# Patient Record
Sex: MALE | Race: WHITE | NOT HISPANIC OR LATINO | URBAN - NONMETROPOLITAN AREA
[De-identification: names, ages, dates, MRNs, and addresses within clinical notes are randomized per-mention and may not be internally consistent; named-entity substitution may affect disease eponyms.]

---

## 2019-06-05 ENCOUNTER — IMPORTED ENCOUNTER (OUTPATIENT)
Dept: URBAN - NONMETROPOLITAN AREA CLINIC 1 | Facility: CLINIC | Age: 69
End: 2019-06-05

## 2019-06-05 PROBLEM — H25.13: Noted: 2019-06-05

## 2019-06-05 PROCEDURE — 92004 COMPRE OPH EXAM NEW PT 1/>: CPT

## 2019-06-05 PROCEDURE — 92015 DETERMINE REFRACTIVE STATE: CPT

## 2019-06-05 NOTE — PATIENT DISCUSSION
Cataracts OU-  discussed findings w/patient-  patient noticing progressively worsening vision OS>OD-  refer to [address] [place] [address] for cat eval patient agrees with plan -  monitor as per JSSimple Myopia OD/Compound Myopic Astigmatism OS w/Presbyopia -  discussed findings w/patient-  new spectacle Rx issued-  do not recommend update until after CE OU -  monitor yearly or prn; 's Notes: MR 6/5/2019DFE 6/5/2019

## 2019-08-09 ENCOUNTER — IMPORTED ENCOUNTER (OUTPATIENT)
Dept: URBAN - NONMETROPOLITAN AREA CLINIC 1 | Facility: CLINIC | Age: 69
End: 2019-08-09

## 2019-08-09 PROCEDURE — 92014 COMPRE OPH EXAM EST PT 1/>: CPT

## 2019-08-09 NOTE — PATIENT DISCUSSION
Cataract(s)-Visually significant cataract OU.-Cataract(s) causing symptomatic impairment of visual function not correctable with a tolerable change in glasses or contact lenses lighting or non-operative means resulting in specific activity limitations and/or participation restrictions including but not limited to reading viewing television driving or meeting vocational or recreational needs. -Expectation is clearer vision and functional improvement in symptoms as well as reduced glare disability after cataract removal.-Order IOLMaster and OPD today. -Recommend Toric IOL OU based on today's OPD testing and lifestyle questionnaire.-Discussed LenSx and RBAs of laser cataract surgery.-All questions were answered regarding surgery including pre and post-op medications appointments activity restrictions and anesthetic usage.-The risks benefits and alternatives and special risk factors for the patient were discussed in detail including but not limited to: bleeding infection retinal detachment vitreous loss problems with the implant and possible need for additional surgery.-Although rare the possibility of complete vision loss was discussed.-The possible need for glasses post-operatively was discussed.-Order medical clearance exam based on history of HBP depression and anxiety.-Patient elects to proceed with cataract surgery OD. Will schedule at patient's convenience and re-evaluate OS in the future. Myopia c myopic degeneration OU.-Discussed diagnosis with patient. -Explained that people who are myopic are at a higher risk for developing RD/RT and reviewed associated S&S.-Pt to contact our office if symptoms develop.; 's Notes: MR 6/5/2019DFE 6/5/2019

## 2019-09-10 PROBLEM — H52.222: Noted: 2019-09-10

## 2019-09-10 PROBLEM — H25.13: Noted: 2019-06-05

## 2019-09-10 PROBLEM — H52.13: Noted: 2019-09-10

## 2019-09-10 PROBLEM — H52.4: Noted: 2019-09-10

## 2019-09-13 PROBLEM — H25.13: Noted: 2019-09-13

## 2019-09-24 ENCOUNTER — IMPORTED ENCOUNTER (OUTPATIENT)
Dept: URBAN - NONMETROPOLITAN AREA CLINIC 1 | Facility: CLINIC | Age: 69
End: 2019-09-24

## 2019-09-24 PROBLEM — H25.13: Noted: 2019-09-24

## 2019-09-24 PROBLEM — I10: Noted: 2019-09-24

## 2019-09-24 PROBLEM — Z01.818: Noted: 2019-09-24

## 2019-09-24 PROBLEM — F33.9: Noted: 2019-09-24

## 2019-09-24 PROCEDURE — 99214 OFFICE O/P EST MOD 30 MIN: CPT

## 2019-09-24 NOTE — PATIENT DISCUSSION
Medical Clearance-Medical clearance done today. -No outstanding concerns that would preclude surgery.-Patient is cleared to proceed with scheduled surgery.; 's Notes: MR 6/5/2019<br />DFE 6/5/2019<br />

## 2019-10-14 ENCOUNTER — IMPORTED ENCOUNTER (OUTPATIENT)
Dept: URBAN - NONMETROPOLITAN AREA CLINIC 1 | Facility: CLINIC | Age: 69
End: 2019-10-14

## 2019-10-14 PROCEDURE — 99024 POSTOP FOLLOW-UP VISIT: CPT

## 2019-10-14 PROCEDURE — 92136 OPHTHALMIC BIOMETRY: CPT

## 2019-10-14 PROCEDURE — 66984 XCAPSL CTRC RMVL W/O ECP: CPT

## 2019-10-14 PROCEDURE — V2787 ASTIGMATISM-CORRECT FUNCTION: HCPCS

## 2019-10-14 NOTE — PATIENT DISCUSSION
s/p PCIOL-Pt doing well s/p PCIOL. -Continue post-op gtts according to instruction sheet and sleep with eye shield over eye for 7 nights.-Avoid bending at the waist lifting anything over 5lbs and dirty or julio environments. -RTC GAYATHRI.; 's Notes: MR 6/5/2019DFE 6/5/2019

## 2019-10-18 ENCOUNTER — IMPORTED ENCOUNTER (OUTPATIENT)
Dept: URBAN - NONMETROPOLITAN AREA CLINIC 1 | Facility: CLINIC | Age: 69
End: 2019-10-18

## 2019-10-18 PROBLEM — H25.13: Noted: 2019-10-18

## 2019-10-18 PROBLEM — I10: Noted: 2019-10-18

## 2019-10-18 PROBLEM — Z01.818: Noted: 2019-10-18

## 2019-10-18 PROBLEM — Z98.41: Noted: 2019-10-18

## 2019-10-18 PROBLEM — H25.12: Noted: 2019-10-18

## 2019-10-18 PROCEDURE — 99024 POSTOP FOLLOW-UP VISIT: CPT

## 2019-10-18 PROCEDURE — 99213 OFFICE O/P EST LOW 20 MIN: CPT

## 2019-10-18 NOTE — PATIENT DISCUSSION
Cataract(s)-Visually significant cataract OS. -Cataract(s) causing symptomatic impairment of visual function not correctable with a tolerable change in glasses or contact lenses lighting or non-operative means resulting in specific activity limitations and/or participation restrictions including but not limited to reading viewing television driving or meeting vocational or recreational needs. -Expectation is clearer vision and functional improvement in symptoms as well as reduced glare disability after cataract removal.-Recommend Toric IOL/Trad based on previous OPD testing and lifestyle questionnaire.-All questions were answered regarding surgery including pre and post-op medications appointments activity restrictions and anesthetic usage.-The risks benefits and alternatives and special risk factors for the patient were discussed in detail including but not limited to: bleeding infection retinal detachment vitreous loss problems with the implant and possible need for additional surgery.-Although rare the possibility of complete vision loss was discussed.-The need for glasses post-operatively was discussed.-Patient elects to proceed with cataract surgery OS. s/p PCIOL OD 10/14/19-Pt doing well at 1 week s/p PCIOL. -Continue post-op gtts according to instruction sheet.-Okay to resume usual activites and d/c eye shield.; 's Notes: MR 6/5/2019DFE 6/5/2019

## 2019-10-18 NOTE — PATIENT DISCUSSION
Medical Clearance-Medical clearance done today. -No outstanding concerns that would preclude surgery.-Patient is cleared to proceed with scheduled surgery.; 's Notes: MR 6/5/2019DFE 6/5/2019

## 2019-10-23 ENCOUNTER — IMPORTED ENCOUNTER (OUTPATIENT)
Dept: URBAN - NONMETROPOLITAN AREA CLINIC 1 | Facility: CLINIC | Age: 69
End: 2019-10-23

## 2019-10-23 PROBLEM — Z98.42: Noted: 2019-10-23

## 2019-10-23 PROCEDURE — V2787 ASTIGMATISM-CORRECT FUNCTION: HCPCS

## 2019-10-23 PROCEDURE — 92136 OPHTHALMIC BIOMETRY: CPT

## 2019-10-23 PROCEDURE — 66984 XCAPSL CTRC RMVL W/O ECP: CPT

## 2019-10-23 NOTE — PATIENT DISCUSSION
Same Day s/p PC IOL OS Trad/Toric 10/23/2019-  discussed findings w/patient-  Pt doing well s/p PCIOL. -  Continue post-op gtts according to instruction sheet and sleep with eye shield over eye for 7 nights. -  Avoid bending at the waist lifting anything over 5lbs and dirty or julio environments.-  RTC 1 week as scheduled or prn; 's Notes: MR 6/5/2019DFE 6/5/2019

## 2019-10-30 ENCOUNTER — IMPORTED ENCOUNTER (OUTPATIENT)
Dept: URBAN - NONMETROPOLITAN AREA CLINIC 1 | Facility: CLINIC | Age: 69
End: 2019-10-30

## 2019-10-30 PROCEDURE — 99024 POSTOP FOLLOW-UP VISIT: CPT

## 2019-10-30 NOTE — PATIENT DISCUSSION
1 week s/p PC IOL OS Trad/Toric 10/23/2019-  discussed findings w/patient-  Pt doing well at 1 week s/p PCIOL. -  Continue post-op gtts according to instruction sheet.-  Okay to resume usual activites and d/c eye shield. -  3 mo f/u Pseudophakia w/DFE; 's Notes: MR 6/5/2019DFE 6/5/2019

## 2022-04-10 ASSESSMENT — VISUAL ACUITY
OS_SC: 20/50
OS_AM: 20/25
OD_GLARE: 20/400
OD_PH: 20/30
OS_CC: J10
OU_SC: 20/40
OD_PAM: 20/25
OS_PH: 20/40
OD_CC: 20/30
OS_CC: CF4'
OD_SC: 20/40-2
OS_CC: CF4'
OD_GLARE: 20/50
OU_CC: J3
OD_SC: 20/40-2
OS_AM: 20/25
OS_GLARE: 20/400
OS_GLARE: 20/400
OS_PH: 20/100
OS_CC: 20/20-
OD_CC: 20/20
OU_CC: J1
OS_SC: 20/50
OS_GLARE: 20/50
OS_CC: 20/100
OU_CC: J1
OD_CC: 20/20
OD_CC: 20/30-1
OS_CC: CF4'

## 2022-04-10 ASSESSMENT — TONOMETRY
OD_IOP_MMHG: 18
OD_IOP_MMHG: 12
OD_IOP_MMHG: 13
OS_IOP_MMHG: 18
OS_IOP_MMHG: 14
OD_IOP_MMHG: 15
OS_IOP_MMHG: 14
OS_IOP_MMHG: 13
OD_IOP_MMHG: 14

## 2022-05-28 PROBLEM — E83.42 HYPOMAGNESEMIA: Status: ACTIVE | Noted: 2022-05-28

## 2022-05-28 PROBLEM — E78.5 HYPERLIPIDEMIA: Status: ACTIVE | Noted: 2019-09-16

## 2022-05-28 PROBLEM — N13.9 OBSTRUCTIVE UROPATHY: Status: ACTIVE | Noted: 2022-05-28

## 2022-05-28 PROBLEM — E87.1 HYPONATREMIA: Status: ACTIVE | Noted: 2022-05-28

## 2022-05-28 PROBLEM — F10.20 ALCOHOL DEPENDENCE (HCC): Status: ACTIVE | Noted: 2022-05-28

## 2022-05-28 PROBLEM — F13.20 BENZODIAZEPINE DEPENDENCE (HCC): Status: ACTIVE | Noted: 2022-05-28

## 2022-05-29 PROBLEM — A41.9 SEPTIC SHOCK (HCC): Status: ACTIVE | Noted: 2022-05-29

## 2022-05-29 PROBLEM — R65.21 SEPTIC SHOCK (HCC): Status: ACTIVE | Noted: 2022-05-29

## 2023-02-01 RX ORDER — DILTIAZEM HYDROCHLORIDE 180 MG/1
180 CAPSULE, EXTENDED RELEASE ORAL DAILY
COMMUNITY
Start: 2022-06-07

## 2023-02-01 RX ORDER — TRAZODONE HYDROCHLORIDE 100 MG/1
100 TABLET ORAL
COMMUNITY

## 2023-02-01 RX ORDER — MAGNESIUM OXIDE 400 MG/1
400 TABLET ORAL 3 TIMES DAILY
COMMUNITY
Start: 2022-06-07

## 2023-02-01 RX ORDER — BUSPIRONE HYDROCHLORIDE 15 MG/1
15 TABLET ORAL 2 TIMES DAILY
COMMUNITY

## 2023-02-01 RX ORDER — BUPROPION HYDROCHLORIDE 150 MG/1
150 TABLET ORAL DAILY
COMMUNITY

## 2023-02-01 RX ORDER — LANOLIN ALCOHOL/MO/W.PET/CERES
100 CREAM (GRAM) TOPICAL DAILY
COMMUNITY
Start: 2022-06-07

## 2023-02-01 RX ORDER — TAMSULOSIN HYDROCHLORIDE 0.4 MG/1
0.4 CAPSULE ORAL DAILY
COMMUNITY
Start: 2022-06-07

## 2023-02-01 RX ORDER — ALPRAZOLAM 0.5 MG/1
0.5 TABLET ORAL 4 TIMES DAILY PRN
COMMUNITY

## 2023-02-01 RX ORDER — DUTASTERIDE 0.5 MG/1
0.5 CAPSULE, LIQUID FILLED ORAL DAILY
COMMUNITY
Start: 2022-06-07

## 2023-02-01 RX ORDER — FOLIC ACID 1 MG/1
1 TABLET ORAL DAILY
COMMUNITY
Start: 2022-06-07

## 2023-02-01 RX ORDER — VENLAFAXINE HYDROCHLORIDE 150 MG/1
75 CAPSULE, EXTENDED RELEASE ORAL DAILY
COMMUNITY

## 2023-12-20 ENCOUNTER — NEW PATIENT (OUTPATIENT)
Dept: RURAL CLINIC 2 | Facility: CLINIC | Age: 73
End: 2023-12-20

## 2023-12-20 DIAGNOSIS — H52.223: ICD-10-CM

## 2023-12-20 DIAGNOSIS — H35.373: ICD-10-CM

## 2023-12-20 DIAGNOSIS — H52.4: ICD-10-CM

## 2023-12-20 DIAGNOSIS — H43.813: ICD-10-CM

## 2023-12-20 DIAGNOSIS — H26.493: ICD-10-CM

## 2023-12-20 DIAGNOSIS — H52.13: ICD-10-CM

## 2023-12-20 DIAGNOSIS — Z96.1: ICD-10-CM

## 2023-12-20 DIAGNOSIS — H17.9: ICD-10-CM

## 2023-12-20 PROCEDURE — 92015 DETERMINE REFRACTIVE STATE: CPT

## 2023-12-20 PROCEDURE — 92134 CPTRZ OPH DX IMG PST SGM RTA: CPT

## 2023-12-20 PROCEDURE — 99204 OFFICE O/P NEW MOD 45 MIN: CPT

## 2023-12-20 ASSESSMENT — VISUAL ACUITY
OD_SC: 20/30
OS_SC: 20/25+

## 2023-12-20 ASSESSMENT — TONOMETRY
OS_IOP_MMHG: 11
OD_IOP_MMHG: 12

## 2025-03-21 ENCOUNTER — COMPREHENSIVE EXAM (OUTPATIENT)
Age: 75
End: 2025-03-21

## 2025-03-21 DIAGNOSIS — H02.88A: ICD-10-CM

## 2025-03-21 DIAGNOSIS — H17.9: ICD-10-CM

## 2025-03-21 DIAGNOSIS — Z96.1: ICD-10-CM

## 2025-03-21 DIAGNOSIS — H52.223: ICD-10-CM

## 2025-03-21 DIAGNOSIS — H01.01B: ICD-10-CM

## 2025-03-21 DIAGNOSIS — H52.4: ICD-10-CM

## 2025-03-21 DIAGNOSIS — H02.88B: ICD-10-CM

## 2025-03-21 DIAGNOSIS — H01.01A: ICD-10-CM

## 2025-03-21 DIAGNOSIS — H35.373: ICD-10-CM

## 2025-03-21 DIAGNOSIS — H52.13: ICD-10-CM

## 2025-03-21 DIAGNOSIS — H43.813: ICD-10-CM

## 2025-03-21 DIAGNOSIS — H26.493: ICD-10-CM

## 2025-03-21 PROCEDURE — 92014 COMPRE OPH EXAM EST PT 1/>: CPT

## 2025-03-21 PROCEDURE — 92015 DETERMINE REFRACTIVE STATE: CPT
